# Patient Record
Sex: FEMALE | Race: WHITE | URBAN - METROPOLITAN AREA
[De-identification: names, ages, dates, MRNs, and addresses within clinical notes are randomized per-mention and may not be internally consistent; named-entity substitution may affect disease eponyms.]

---

## 2020-01-08 ENCOUNTER — OFFICE VISIT (OUTPATIENT)
Dept: NEUROLOGY | Age: 20
End: 2020-01-08

## 2020-01-08 VITALS
HEART RATE: 76 BPM | HEIGHT: 64 IN | BODY MASS INDEX: 20.49 KG/M2 | WEIGHT: 120 LBS | SYSTOLIC BLOOD PRESSURE: 114 MMHG | OXYGEN SATURATION: 98 % | DIASTOLIC BLOOD PRESSURE: 72 MMHG

## 2020-01-08 DIAGNOSIS — G40.909 NONINTRACTABLE EPILEPSY WITHOUT STATUS EPILEPTICUS, UNSPECIFIED EPILEPSY TYPE (HCC): Primary | ICD-10-CM

## 2020-01-08 RX ORDER — FLUOXETINE HYDROCHLORIDE 40 MG/1
CAPSULE ORAL DAILY
COMMUNITY

## 2020-01-08 RX ORDER — LAMOTRIGINE 100 MG/1
100 TABLET ORAL 2 TIMES DAILY
COMMUNITY
End: 2020-01-08

## 2020-01-08 RX ORDER — LAMOTRIGINE 100 MG/1
100 TABLET ORAL 2 TIMES DAILY
Qty: 60 TAB | Refills: 5 | Status: SHIPPED | OUTPATIENT
Start: 2020-01-08 | End: 2020-03-11

## 2020-01-08 NOTE — PROGRESS NOTES
Last seizure was June or July 2019 because she missed a dose of lamotrigine. Maybe 5 total seizures in her lifetime. Used to see Dr. Ellie Jenkins in [place] but turned 16 and switch to Dr. Izzy Dhaliwal in [address] [place] [address]. She saw Dr. Izzy Dhaliwal for about 6 months.  Recently moved to [place]

## 2020-01-08 NOTE — PROGRESS NOTES
Name:  Tristian Spencer      :  2000    PCP:   No primary care provider on file. Referring:  Self  MRN:   <[id number]>    Chief Complaint:   Chief Complaint   Patient presents with    Seizure       HISTORY OF PRESENT ILLNESS:     This is a 22 y.o. female with PMHx Epilepsy (diagnosed ar 12 yo), who recently relocated to [address] [place] [address] and presents to [facility] care. She describes that her first seizure occurred when she was 11years old without a clear cause or trigger. She says she had a second seizure a few months later. She was living in [place] at the time and started seeing a pediatric neurologist/Dr. Holly Valentino. Recalls being started on Keppra but had some behavioral side effect so she was changed to Lamictal.  She reports that her seizures were very well controlled for many years so when she turned 21 she discussed with her neurologist at the time about stopping the Lamictal.  After stopping the Lamictal she had a another seizure so that was restarted (lamictal 100 mg BID). She denies any side effects from the Lamictal and says she only has a seizure if she forgets to take one of her tablets, and the last time that happened was 2019. Complete Review of Systems: +  anxiety, depression; otherwise as noted in HPI     No Known Allergies  Past Medical History:   Diagnosis Date    Anxiety disorder     Depression     Seizures (HCC)      Current Outpatient Medications   Medication Sig Dispense Refill    FLUoxetine (PROZAC) 40 mg capsule Take  by mouth daily.  lamoTRIgine (LAMICTAL) 100 mg tablet Take 1 Tab by mouth two (2) times a day. Anti-seizure medication 60 Tab 5     History reviewed. No pertinent surgical history.   Family History   Problem Relation Age of Onset    Cancer Paternal Grandmother     Parkinsonism Paternal Grandmother      Social History     Socioeconomic History    Marital status: SINGLE     Spouse name: Not on file    Number of children: Not on file    Years of education: Not on file    Highest education level: Not on file   Occupational History    Not on file   Social Needs    Financial resource strain: Not on file    Food insecurity:     Worry: Not on file     Inability: Not on file    Transportation needs:     Medical: Not on file     Non-medical: Not on file   Tobacco Use    Smoking status: Never Smoker    Smokeless tobacco: Never Used   Substance and Sexual Activity    Alcohol use: Yes     Alcohol/week: 2.0 standard drinks     Types: 2 Cans of beer per week    Drug use: Not on file    Sexual activity: Not on file   Lifestyle    Physical activity:     Days per week: Not on file     Minutes per session: Not on file    Stress: Not on file   Relationships    Social connections:     Talks on phone: Not on file     Gets together: Not on file     Attends Alevism service: Not on file     Active member of club or organization: Not on file     Attends meetings of clubs or organizations: Not on file     Relationship status: Not on file    Intimate partner violence:     Fear of current or ex partner: Not on file     Emotionally abused: Not on file     Physically abused: Not on file     Forced sexual activity: Not on file   Other Topics Concern    Not on file   Social History Narrative    Not on file       PHYSICAL EXAM  Vitals:    01/08/20 1302   BP: 114/72   Pulse: 76   SpO2: 98%   Weight: 54.4 kg (120 lb)   Height: 5' 4\" (1.626 m)       General:  Alert, cooperative, NAD   Head:  Normocephalic, atraumatic. Eyes:  Conjunctivae/corneas clear   Lungs:  Heart:  Non labored breathing  Regular rate, rhythm   Extremities: No edema.    Skin: No rashes    Neurologic Exam       Language: normal  Memory:  Alert, oriented to person, place, situation    Cranial Nerves:  I: smell Not tested   II: visual fields Full to confrontation   II: pupils Equal, round, reactive to light   II: optic disc No papilledema   III,VII: ptosis none   III,IV,VI: extraocular muscles normal   V: facial light touch sensation  normal   VII: facial muscle function  symmetric   VIII: hearing symmetric   IX: soft palate elevation  normal   XI: sternocleidomastoid strength 5/5   XII: tongue  midline      Motor: normal bulk, tone, strength in all exts  Sensory: intact to LT, PP, temp, vibration x 4 exts   Cerebellar: no rest, postural, or intention tremor  Normal FNF and H-Shin bilaterally  Reflexes: 2+ throughout  Plantar response: neutral bilaterally    Gait: normal gait including tandem  Romberg negative     ASSESSMENT AND PLAN    ICD-10-CM ICD-9-CM    1. Nonintractable epilepsy without status epilepticus, unspecified epilepsy type ([facility] G40.909 345.90 lamoTRIgine (LAMICTAL) 100 mg tablet     Hx of Epilepsy, starting around 2014-5 (10 yo). Has been well controlled on Lamictal 100 mg BID. No SEFx that she can tell. Discussed with patient option of changing to Lamcital  mg once a day to reduce chance of missing a dose. She says she has only rarely missed a dose of the immediate release version so is comfortable continuing it. Renewed Rx and sent to local pharmacy. Discussed starting Folic Acid while being on AED, to reduce chance of neural tube defect/ pregnancy complication. Pt doesn't feel she needs to start the Folic acid at this time as she doesn't anticipate pregnancy for a few years. Recommended that if she starts considering getting pregnant, then she should be Folic acid for at least 6 months before trying to conceive. Pt signed QUANG so I can get 1-2 clinic notes/ EEG/ MRI Brain from her Pediatric Neurologist in [place] and last 2 clinic notes and any recent labs from her Adult Neurologist in [facility].        Follow up in 6 months    Signed By: Amanda Guerra MD     January 8, 2020

## 2020-03-11 ENCOUNTER — OFFICE VISIT (OUTPATIENT)
Dept: NEUROLOGY | Age: 20
End: 2020-03-11

## 2020-03-11 VITALS
SYSTOLIC BLOOD PRESSURE: 122 MMHG | OXYGEN SATURATION: 98 % | BODY MASS INDEX: 20.49 KG/M2 | WEIGHT: 120 LBS | HEIGHT: 64 IN | DIASTOLIC BLOOD PRESSURE: 68 MMHG | HEART RATE: 106 BPM

## 2020-03-11 DIAGNOSIS — R53.83 FATIGUE, UNSPECIFIED TYPE: ICD-10-CM

## 2020-03-11 DIAGNOSIS — G40.909 NONINTRACTABLE EPILEPSY WITHOUT STATUS EPILEPTICUS, UNSPECIFIED EPILEPSY TYPE (HCC): Primary | ICD-10-CM

## 2020-03-11 RX ORDER — LAMOTRIGINE 200 MG/1
200 TABLET, EXTENDED RELEASE ORAL DAILY
Qty: 90 TAB | Refills: 1 | Status: SHIPPED | OUTPATIENT
Start: 2020-03-11 | End: 2020-09-10

## 2020-03-11 NOTE — PROGRESS NOTES
Neurology Progress Note    Patient ID:   Maria Elena Salguero  <[id number]>  22 y.o.  2000    Date of Office Visit: 03/11/20    Chief Complaint   Patient presents with    Seizure       Interval Hx:     Received clinic note (11-1-18) from her last Neurologist in [place] (Dr Tien Wheeler MD/ [facility]). It describes her has starting to have seizure in 2013 (description of generalized tonic-clonic events with loss of recall and postictal phase). She was evaluated by a Dr Dewayne Yeboah initially and EEG showed both focal and generalized seizure discharges. She was tried on Keppra, had side effects, so that was stopped and she was placed on Lamotrigine monotherapy without any further seizures. She had gone 2 years without a seizure so they tried stopping her Lamotrigine but she had recurrence of seizure in late October 2018 (had not slept much the night before) so was placed back on Lamotrigine. Pt denies any seizures since her last visit/ 1-8-2020. Tolerating Lamictal 100 mg BID, no SEFx that she can tell. Does say she's been feeling fatigued for a while and unsure if it's the Lamictal.  No recent labs. She would like to change to the Lamictal ER version for convenience. Brief ROS: as noted above or otherwise negative    Brief Hx/ Prior Data:     See initial visit dated 1-8-2020 for full details    Hx of Epilepsy, starting around 2014-5 (16 yo). Has been well controlled on Lamictal 100 mg BID. No SEFx that she can tell. Discussed with patient option of changing to Lamcital  mg once a day to reduce chance of missing a dose. She says she has only rarely missed a dose of the immediate release version so is comfortable continuing it. Renewed Rx and sent to local pharmacy. Discussed starting Folic Acid while being on AED, to reduce chance of neural tube defect/ pregnancy complication.   Pt doesn't feel she needs to start the Folic acid at this time as she doesn't anticipate pregnancy for a few years. Recommended that if she starts considering getting pregnant, then she should be Folic acid for at least 6 months before trying to conceive. Pt signed QUANG so I can get 1-2 clinic notes/ EEG/ MRI Brain from her Pediatric Neurologist in [place] and last 2 clinic notes and any recent labs from her Adult Neurologist in [place], [place].        Objective: PMHx:  has a past medical history of Anxiety disorder, Depression, and Seizures ([facility] PSH:   has no past surgical history on file. Allergies:   No Known Allergies    Meds:     Current Outpatient Medications   Medication Sig    lamoTRIgine (LaMICtal XR) 200 mg tr24 ER tablet Take 1 Tab by mouth daily for 90 days. Anti-seizure medication    FLUoxetine (PROZAC) 40 mg capsule Take  by mouth daily. PHYSICAL EXAM    Vitals:    03/11/20 1138   BP: 122/68   Pulse: (!) 106   SpO2: 98%   Weight: 54.4 kg (120 lb)   Height: 5' 4\" (1.626 m)       General:   Mental status: Awake, alert, cooperative. Neck: supple    Extremities: no edema    Skin: no rashes    Neuro Exam:    CNs:   Facial movements: symmetric. Visual fields; intact in all quadrants, bilateral   EOM: conjugate eye movements bilateral    Hearing: normal    Speech: no aphasia, no dysarthria, normal fluency    Motor:  5/5 in upper and lower extremities, symmetric. Cerebellar:  Normal FNF bilaterally  Sensory: intact to LT in all exts. Reflexes:  2+ biceps, 3+ patellars (symmetric)    Gait: normal     Impression/ Plan:       ICD-10-CM ICD-9-CM    1. Nonintractable epilepsy without status epilepticus, unspecified epilepsy type ([facility] G40.909 345.90 lamoTRIgine (LaMICtal XR) 200 mg tr24 ER tablet      METABOLIC PANEL, COMPREHENSIVE      CBC WITH AUTOMATED DIFF      VITAMIN D, 25 HYDROXY      VITAMIN B12 & FOLATE      TSH 3RD GENERATION      LAMOTRIGINE (LAMICTAL)   2.  Fatigue, unspecified type Z84.30 679.79 METABOLIC PANEL, COMPREHENSIVE      CBC WITH AUTOMATED DIFF      VITAMIN D, 25 HYDROXY      VITAMIN B12 & FOLATE      TSH 3RD GENERATION       Changed Lamictal 100 mg BID to Lamictal  mg once daily. Check CBC, CMP, Lamictal level, TSH, Vit D, Vit B12/ Folate for complaints of fatigue, to monitor Lamictal level, and to monitor for any metabolic effects from Lamictal itself.  Pt signed QUANG so I can get last EEG notes from Dr Rich Bailey / Pediatric Neurology/ [place], [place].    F/u in 6 months    Signed By: Amanda Guerra MD     March 11, 2020

## 2020-09-09 DIAGNOSIS — G40.909 NONINTRACTABLE EPILEPSY WITHOUT STATUS EPILEPTICUS, UNSPECIFIED EPILEPSY TYPE (HCC): ICD-10-CM

## 2020-09-10 RX ORDER — LAMOTRIGINE 200 MG/1
TABLET, EXTENDED RELEASE ORAL
Qty: 90 TAB | Refills: 0 | Status: SHIPPED | OUTPATIENT
Start: 2020-09-10 | End: 2020-09-25 | Stop reason: SDUPTHER

## 2020-09-25 ENCOUNTER — OFFICE VISIT (OUTPATIENT)
Dept: NEUROLOGY | Age: 20
End: 2020-09-25
Payer: COMMERCIAL

## 2020-09-25 VITALS
BODY MASS INDEX: 20.6 KG/M2 | SYSTOLIC BLOOD PRESSURE: 110 MMHG | HEART RATE: 85 BPM | TEMPERATURE: 98.5 F | WEIGHT: 120 LBS | DIASTOLIC BLOOD PRESSURE: 62 MMHG | OXYGEN SATURATION: 98 %

## 2020-09-25 DIAGNOSIS — G40.909 NONINTRACTABLE EPILEPSY WITHOUT STATUS EPILEPTICUS, UNSPECIFIED EPILEPSY TYPE (HCC): Primary | ICD-10-CM

## 2020-09-25 PROCEDURE — 99214 OFFICE O/P EST MOD 30 MIN: CPT | Performed by: PSYCHIATRY & NEUROLOGY

## 2020-09-25 RX ORDER — LAMOTRIGINE 200 MG/1
200 TABLET, EXTENDED RELEASE ORAL DAILY
Qty: 90 TAB | Refills: 2 | Status: SHIPPED | OUTPATIENT
Start: 2020-09-25 | End: 2021-08-31

## 2020-09-25 NOTE — PROGRESS NOTES
Neurology Progress Note    Patient ID:   Armani Gilmore  [id number]  23 y.o.  2000    Date of Office Visit: 09/25/20    Chief Complaint   Patient presents with    Seizure       Interval Hx:     Pt denes any seizures since last visit on 3-. Reports compliance with Lamictal  mg once daily, takes in evening  Denies any SEFx from it  Last visit she reported having fatigue and was unsure if it was from the Lamictal  Ordered labs (CBC, CMP, TSH, B12, Vit D, Lamictal level) which she hasn't gotten around to doing    Brief ROS: as noted above or otherwise negative    Brief Hx/ Prior Data:     See initial visit dated 1-8-2020 for full details    Hx of Epilepsy, starting around 2014-5 (14 yo). Has been well controlled on Lamictal 100 mg BID. No SEFx that she can tell. Discussed with patient option of changing to Lamcital  mg once a day to reduce chance of missing a dose. She says she has only rarely missed a dose of the immediate release version so is comfortable continuing it. Renewed Rx and sent to local pharmacy. Discussed starting Folic Acid while being on AED, to reduce chance of neural tube defect/ pregnancy complication. Pt doesn't feel she needs to start the Folic acid at this time as she doesn't anticipate pregnancy for a few years. Recommended that if she starts considering getting pregnant, then she should be Folic acid for at least 6 months before trying to conceive. Received clinic note (11-1-18) from her last Neurologist in [place] (Dr Renato Devries MD/ [facility] [place], [place]). It describes her has starting to have seizure in 2013 (description of generalized tonic-clonic events with loss of recall and postictal phase). She was evaluated by a Dr Jorge Go initially and EEG showed both focal and generalized seizure discharges.   She was tried on Keppra, had side effects, so that was stopped and she was placed on Lamotrigine monotherapy without any further seizures. She had gone 2 years without a seizure so they tried stopping her Lamotrigine but she had recurrence of seizure in late October 2018 (had not slept much the night before) so was placed back on Lamotrigine. Objective: PMHx:  has a past medical history of Anxiety disorder, Depression, and Seizures ([facility] PSH:   has no past surgical history on file. Allergies:   No Known Allergies    Meds:     Current Outpatient Medications   Medication Sig    levonorgestreL (MIRENA) 20 mcg/24 hours (5 yrs) 52 mg IUD 1 Device by IntraUTERine route once.  lamoTRIgine (LaMICtal XR) 200 mg tr24 ER tablet Take 1 Tab by mouth daily for 90 days.  FLUoxetine (PROZAC) 40 mg capsule Take  by mouth daily. PHYSICAL EXAM    Vitals:    09/25/20 1034   BP: 110/62   BP 1 Location: Left arm   BP Patient Position: Sitting   Pulse: 85   Temp: 98.5 °F (36.9 °C)   SpO2: 98%   Weight: 54.4 kg (120 lb)       General:   Mental status: Awake, alert, cooperative. Neck: supple    Extremities: no edema    Skin: no rashes    Neuro Exam:    CNs:   Facial movements: symmetric. Visual fields; intact in all quadrants, bilateral   EOM: conjugate eye movements bilateral    Hearing: normal    Speech: no aphasia, no dysarthria, normal fluency    Motor:  5/5 in upper and lower extremities, symmetric. Cerebellar:  Normal FNF bilaterally  Sensory: intact to LT in all exts. Reflexes:  2+, symmetric  Gait: normal     Impression/ Plan:       ICD-10-CM ICD-9-CM    1.  Nonintractable epilepsy without status epilepticus, unspecified epilepsy type ([facility]  G40.909 345.90 lamoTRIgine (LaMICtal XR) 200 mg tr24 ER tablet      Renewed Rx Lamictal  mg/ day (90 day Rx with  2 RFs)  Reprinted lab request: CBC, CMP, Lamictal Level, TSH, Vit D, B12/ Folate  Follow up in 1 year      Signed By: Charlie Lezama MD     September 25, 2020

## 2021-10-28 ENCOUNTER — OFFICE VISIT (OUTPATIENT)
Dept: NEUROLOGY | Age: 21
End: 2021-10-28
Payer: COMMERCIAL

## 2021-10-28 VITALS
WEIGHT: 118 LBS | RESPIRATION RATE: 14 BRPM | DIASTOLIC BLOOD PRESSURE: 70 MMHG | HEIGHT: 64 IN | SYSTOLIC BLOOD PRESSURE: 122 MMHG | BODY MASS INDEX: 20.14 KG/M2 | HEART RATE: 80 BPM | OXYGEN SATURATION: 99 %

## 2021-10-28 DIAGNOSIS — G40.909 NONINTRACTABLE EPILEPSY WITHOUT STATUS EPILEPTICUS, UNSPECIFIED EPILEPSY TYPE (HCC): Primary | ICD-10-CM

## 2021-10-28 PROCEDURE — 99215 OFFICE O/P EST HI 40 MIN: CPT | Performed by: PSYCHIATRY & NEUROLOGY

## 2021-10-28 RX ORDER — LAMOTRIGINE 200 MG/1
200 TABLET, EXTENDED RELEASE ORAL DAILY
Qty: 90 TABLET | Refills: 2 | Status: SHIPPED | OUTPATIENT
Start: 2021-10-28 | End: 2021-12-02 | Stop reason: SDUPTHER

## 2021-10-28 NOTE — PROGRESS NOTES
Sybil Strauss (2000) is a 19 y.o. female, established patient, here for evaluation of the following     Chief complaint(s):   Chief Complaint   Patient presents with    Follow-up     annual visit seizures, no complaints stable on medication       SUBJECTIVE/ OBJECTIVE:    HPI: 19 y.o. female      Pt denes any seizures since last visit on 9-2020    Reports compliance with Lamictal  mg once daily, takes in evening  Denies any SEFx from it  Continues to report fatigue, unsure if it's lamictal or other cause  Labs not done yet (CBC, CMP, TSH, B12, Vit D, Lamictal level)        Review of Systems: as above    ========================================    Brief Hx:     See initial visit dated 1-8-2020 for full details     Hx of Epilepsy, starting around 2014-5 (14 yo). Has been well controlled on Lamictal 100 mg BID.  No SEFx that she can tell. Discussed with patient option of changing to Lamcital  mg once a day to reduce chance of missing a dose.  She says she has only rarely missed a dose of the immediate release version so is comfortable continuing it.  Renewed Rx and sent to local pharmacy.  Discussed starting Folic Acid while being on AED, to reduce chance of neural tube defect/ pregnancy complication.  Pt doesn't feel she needs to start the Folic acid at this time as she doesn't anticipate pregnancy for a few years.  Recommended that if she starts considering getting pregnant, then she should be Folic acid for at least 6 months before trying to conceive.      Received clinic note (11-1-18) from her last Neurologist in [place] (Dr Maico Paulson MD/ [facility] [place], [place]). It describes her has starting to have seizure in 2013 (description of generalized tonic-clonic events with loss of recall and postictal phase). She was evaluated by a Dr Elvis Bishop initially and EEG showed both focal and generalized seizure discharges.   She was tried on Keppra, had side effects, so that was stopped and she was placed on Lamotrigine monotherapy without any further seizures. She had gone 2 years without a seizure so they tried stopping her Lamotrigine but she had recurrence of seizure in late October 2018 (had not slept much the night before) so was placed back on Lamotrigine.       No Known Allergies      Current Outpatient Medications:     lamoTRIgine (LaMICtal XR) 200 mg tr24 ER tablet, Take 1 Tablet by mouth daily. Anti-seizure medication, Disp: 90 Tablet, Rfl: 2    levonorgestreL (MIRENA) 20 mcg/24 hours (5 yrs) 52 mg IUD, 1 Device by IntraUTERine route once., Disp: , Rfl:     FLUoxetine (PROZAC) 40 mg capsule, Take  by mouth daily. , Disp: , Rfl:      has a past medical history of Anxiety disorder, Depression, and Seizures ([facility] has no past surgical history on file. Physical Exam:    Vitals:    10/28/21 0852   BP: 122/70   BP 1 Location: Left upper arm   BP Patient Position: Sitting   Pulse: 80   Resp: 14   Height: 5' 4\" (1.626 m)   Weight: 53.5 kg (118 lb)   SpO2: 99%     Wake alert oriented to person place and situation. Hearing is normal.  Speech is normal.  Visual fields are grossly normal.  5 out of 5 strength in all extremities. Gait is normal.  Intact light touch in all extremities. No resting postural or intention tremor.    ========================================    ASSESSMENT/ PLAN:       ICD-10-CM ICD-9-CM    1.  Nonintractable epilepsy without status epilepticus, unspecified epilepsy type ([facility]  G40.909 345.90 LAMOTRIGINE (LAMICTAL)      TSH 3RD GENERATION      VITAMIN B12 & FOLATE      VITAMIN D, 25 HYDROXY      CBC WITH AUTOMATED DIFF      METABOLIC PANEL, COMPREHENSIVE      LAMOTRIGINE (LAMICTAL)      TSH 3RD GENERATION      VITAMIN B12 & FOLATE      VITAMIN D, 25 HYDROXY      CBC WITH AUTOMATED DIFF      METABOLIC PANEL, COMPREHENSIVE      lamoTRIgine (LaMICtal XR) 200 mg tr24 ER tablet      Reprinted labs for pt to get done today at [address] [place] [address] ([facility])  Renewed Rx Lamotrigine  mg once daily (90 day Rx + 2 RFs)  F/u in 1 year; pt knows to call sooner if any questions/ concerns      An electronic signature was used to authenticate this note.   -- Omar Stewart MD

## 2021-10-28 NOTE — PROGRESS NOTES
Chief Complaint   Patient presents with    Follow-up     annual visit seizures, no complaints stable on medication     Visit Vitals  /70 (BP 1 Location: Left upper arm, BP Patient Position: Sitting)   Pulse 80   Resp 14   Ht 5' 4\" (1.626 m)   Wt 53.5 kg (118 lb)   SpO2 99%   BMI 20.25 kg/m²

## 2021-10-30 LAB
25(OH)D3+25(OH)D2 SERPL-MCNC: 28.2 NG/ML (ref 30–100)
ALBUMIN SERPL-MCNC: 4.8 G/DL (ref 3.9–5)
ALBUMIN/GLOB SERPL: 3 {RATIO} (ref 1.2–2.2)
ALP SERPL-CCNC: 95 IU/L (ref 44–121)
ALT SERPL-CCNC: 5 IU/L (ref 0–32)
AST SERPL-CCNC: 16 IU/L (ref 0–40)
BASOPHILS # BLD AUTO: 0 X10E3/UL (ref 0–0.2)
BASOPHILS NFR BLD AUTO: 1 %
BILIRUB SERPL-MCNC: 0.3 MG/DL (ref 0–1.2)
BUN SERPL-MCNC: 10 MG/DL (ref 6–20)
BUN/CREAT SERPL: 14 (ref 9–23)
CALCIUM SERPL-MCNC: 9.5 MG/DL (ref 8.7–10.2)
CHLORIDE SERPL-SCNC: 103 MMOL/L (ref 96–106)
CO2 SERPL-SCNC: 26 MMOL/L (ref 20–29)
CREAT SERPL-MCNC: 0.69 MG/DL (ref 0.57–1)
EOSINOPHIL # BLD AUTO: 0.2 X10E3/UL (ref 0–0.4)
EOSINOPHIL NFR BLD AUTO: 7 %
ERYTHROCYTE [DISTWIDTH] IN BLOOD BY AUTOMATED COUNT: 12.2 % (ref 11.7–15.4)
FOLATE SERPL-MCNC: 8.2 NG/ML
GLOBULIN SER CALC-MCNC: 1.6 G/DL (ref 1.5–4.5)
GLUCOSE SERPL-MCNC: 82 MG/DL (ref 65–99)
HCT VFR BLD AUTO: 40.8 % (ref 34–46.6)
HGB BLD-MCNC: 13.5 G/DL (ref 11.1–15.9)
IMM GRANULOCYTES # BLD AUTO: 0 X10E3/UL (ref 0–0.1)
IMM GRANULOCYTES NFR BLD AUTO: 0 %
LAMOTRIGINE SERPL-MCNC: 7 UG/ML (ref 2–20)
LYMPHOCYTES # BLD AUTO: 0.9 X10E3/UL (ref 0.7–3.1)
LYMPHOCYTES NFR BLD AUTO: 41 %
MCH RBC QN AUTO: 30 PG (ref 26.6–33)
MCHC RBC AUTO-ENTMCNC: 33.1 G/DL (ref 31.5–35.7)
MCV RBC AUTO: 91 FL (ref 79–97)
MONOCYTES # BLD AUTO: 0.5 X10E3/UL (ref 0.1–0.9)
MONOCYTES NFR BLD AUTO: 23 %
MORPHOLOGY BLD-IMP: ABNORMAL
NEUTROPHILS # BLD AUTO: 0.6 X10E3/UL (ref 1.4–7)
NEUTROPHILS NFR BLD AUTO: 28 %
PLATELET # BLD AUTO: 201 X10E3/UL (ref 150–450)
POTASSIUM SERPL-SCNC: 4.5 MMOL/L (ref 3.5–5.2)
PROT SERPL-MCNC: 6.4 G/DL (ref 6–8.5)
RBC # BLD AUTO: 4.5 X10E6/UL (ref 3.77–5.28)
SODIUM SERPL-SCNC: 141 MMOL/L (ref 134–144)
TSH SERPL DL<=0.005 MIU/L-ACNC: 0.68 UIU/ML (ref 0.45–4.5)
VIT B12 SERPL-MCNC: 359 PG/ML (ref 232–1245)
WBC # BLD AUTO: 2.3 X10E3/UL (ref 3.4–10.8)

## 2021-12-02 ENCOUNTER — TELEPHONE (OUTPATIENT)
Dept: NEUROLOGY | Age: 21
End: 2021-12-02

## 2021-12-02 DIAGNOSIS — G40.909 NONINTRACTABLE EPILEPSY WITHOUT STATUS EPILEPTICUS, UNSPECIFIED EPILEPSY TYPE (HCC): ICD-10-CM

## 2021-12-02 RX ORDER — LAMOTRIGINE 200 MG/1
200 TABLET, EXTENDED RELEASE ORAL DAILY
Qty: 90 TABLET | Refills: 0 | Status: SHIPPED | OUTPATIENT
Start: 2021-12-02

## 2021-12-02 NOTE — TELEPHONE ENCOUNTER
Let pt know that I sent a 90 day Rx + 2 Refills on 10- to her pharmacy (receipt confirmed). Please call the pharmacy and confirm they have the Rx. Thanks.

## 2022-01-06 DIAGNOSIS — D70.9 NEUTROPENIA, UNSPECIFIED TYPE (HCC): Primary | ICD-10-CM

## 2022-01-06 NOTE — PROGRESS NOTES
Lab results: 10-30-21: Lamotrigine level, TSH, B12 are normal. Vit D level is mildly low. CBC shows low WBCs 2.3k and low Absolute Neutrophil Count (0.6k). No prior CBC on file to compare. May be due to Lamotrigine. Will ask Nurse to relay the CBC findings, and we should repeat CBC. If still abnormal, then I will refer patient to Hematologist for their evaluation.

## 2022-01-07 ENCOUNTER — TELEPHONE (OUTPATIENT)
Dept: NEUROLOGY | Age: 22
End: 2022-01-07

## 2022-01-07 NOTE — TELEPHONE ENCOUNTER
----- Message from Raul Mejia MD sent at 1/6/2022 10:18 AM EST -----  Lab results: 10-30-21: Lamotrigine level, TSH, B12 are normal. Vit D level is mildly low. CBC shows low WBCs 2.3k and low Absolute Neutrophil Count (0.6k). No prior CBC on file to compare. May be due to Lamotrigine. Will ask Nurse to relay the CBC findings, and we should repeat CBC. If still abnormal, then I will refer patient to Hematologist for their evaluation.       Entered order for repeat CBC    ----- Message -----  From: Beatrice Champion Lab Results In  Sent: 10/30/2021   5:39 AM EST  To: Raul Mejia MD

## 2022-01-11 ENCOUNTER — TELEPHONE (OUTPATIENT)
Dept: NEUROLOGY | Age: 22
End: 2022-01-11

## 2022-01-11 NOTE — TELEPHONE ENCOUNTER
----- Message from Kandy Velarde MD sent at 1/6/2022 10:18 AM EST -----  Lab results: 10-30-21: Lamotrigine level, TSH, B12 are normal. Vit D level is mildly low. CBC shows low WBCs 2.3k and low Absolute Neutrophil Count (0.6k). No prior CBC on file to compare. May be due to Lamotrigine. Will ask Nurse to relay the CBC findings, and we should repeat CBC. If still abnormal, then I will refer patient to Hematologist for their evaluation.       Entered order for repeat CBC    ----- Message -----  From: Beatrice Champion Lab Results In  Sent: 10/30/2021   5:39 AM EST  To: Kandy Velarde MD

## 2022-01-11 NOTE — TELEPHONE ENCOUNTER
----- Message from Melissa Reeves MD sent at 1/6/2022 10:18 AM EST -----  Lab results: 10-30-21: Lamotrigine level, TSH, B12 are normal. Vit D level is mildly low. CBC shows low WBCs 2.3k and low Absolute Neutrophil Count (0.6k). No prior CBC on file to compare. May be due to Lamotrigine. Will ask Nurse to relay the CBC findings, and we should repeat CBC. If still abnormal, then I will refer patient to Hematologist for their evaluation.       Entered order for repeat CBC    ----- Message -----  From: Akira Labcorachelle Lab Results In  Sent: 10/30/2021   5:39 AM EST  To: Melissa Revees MD

## 2022-01-11 NOTE — TELEPHONE ENCOUNTER
Attempted to call patient again to make aware of lab results. Left voicemail for patient to call back, unable to leave detailed message.

## 2022-01-13 ENCOUNTER — TELEPHONE (OUTPATIENT)
Dept: NEUROLOGY | Age: 22
End: 2022-01-13

## 2022-01-13 NOTE — TELEPHONE ENCOUNTER
----- Message from Dane Ruiz MD sent at 1/6/2022 10:18 AM EST -----  Lab results: 10-30-21: Lamotrigine level, TSH, B12 are normal. Vit D level is mildly low. CBC shows low WBCs 2.3k and low Absolute Neutrophil Count (0.6k). No prior CBC on file to compare. May be due to Lamotrigine. Will ask Nurse to relay the CBC findings, and we should repeat CBC. If still abnormal, then I will refer patient to Hematologist for their evaluation.       Entered order for repeat CBC    ----- Message -----  From: Beatrice Champion Lab Results In  Sent: 10/30/2021   5:39 AM EST  To: Dane Ruiz MD

## 2022-01-21 ENCOUNTER — TELEPHONE (OUTPATIENT)
Dept: NEUROLOGY | Age: 22
End: 2022-01-21

## 2022-11-30 DIAGNOSIS — G40.909 NONINTRACTABLE EPILEPSY WITHOUT STATUS EPILEPTICUS, UNSPECIFIED EPILEPSY TYPE (HCC): ICD-10-CM

## 2022-12-01 ENCOUNTER — PATIENT MESSAGE (OUTPATIENT)
Dept: NEUROLOGY | Age: 22
End: 2022-12-01

## 2022-12-01 RX ORDER — LAMOTRIGINE 200 MG/1
TABLET, EXTENDED RELEASE ORAL
Qty: 90 TABLET | Refills: 0 | Status: SHIPPED | OUTPATIENT
Start: 2022-12-01

## 2022-12-01 NOTE — TELEPHONE ENCOUNTER
lamoTRIgine (LaMICtal XR) 200 mg tr24 ER tablet    Pt calling to request that we please get refill in to pharmacy as soon as possible due to pt having to go out of town this coming weekend.

## 2023-02-28 ENCOUNTER — TELEPHONE (OUTPATIENT)
Dept: NEUROLOGY | Age: 23
End: 2023-02-28

## 2023-02-28 NOTE — TELEPHONE ENCOUNTER
Patient needs a refill on her medication LAMOTRIGINE. Patient is completley out of seizure medication.     Please contact

## 2023-03-01 ENCOUNTER — OFFICE VISIT (OUTPATIENT)
Dept: NEUROLOGY | Age: 23
End: 2023-03-01
Payer: COMMERCIAL

## 2023-03-01 VITALS
HEART RATE: 94 BPM | OXYGEN SATURATION: 98 % | TEMPERATURE: 98 F | SYSTOLIC BLOOD PRESSURE: 114 MMHG | DIASTOLIC BLOOD PRESSURE: 62 MMHG

## 2023-03-01 DIAGNOSIS — G40.909 NONINTRACTABLE EPILEPSY WITHOUT STATUS EPILEPTICUS, UNSPECIFIED EPILEPSY TYPE (HCC): Primary | ICD-10-CM

## 2023-03-01 PROCEDURE — 99214 OFFICE O/P EST MOD 30 MIN: CPT | Performed by: PSYCHIATRY & NEUROLOGY

## 2023-03-01 RX ORDER — LAMOTRIGINE 200 MG/1
200 TABLET, EXTENDED RELEASE ORAL DAILY
Qty: 90 TABLET | Refills: 1 | Status: SHIPPED | OUTPATIENT
Start: 2023-03-01 | End: 2023-05-30

## 2023-03-01 NOTE — PROGRESS NOTES
Chief Complaint   Patient presents with    Seizure     Patient states she is stable on medication, n recent seizure activity.      Visit Vitals  /62   Pulse 94   Temp 98 °F (36.7 °C)   SpO2 98%

## 2023-03-01 NOTE — PROGRESS NOTES
Rod Varma (2000) is a 23 y.o. female, established patient, here for evaluation of the following     Chief complaint(s):   Chief Complaint   Patient presents with    Seizure     Patient states she is stable on medication, n recent seizure activity. SUBJECTIVE/ OBJECTIVE:    HPI: 23 y.o. female      Last seen on 10-  Pt denes any seizures since last visit  Reports compliance with Lamictal  mg once daily, takes in evening  Denies any SEFx from it     Last/ only labs on file were from 10-  Lamotrigine level, TSH, B12 were normal   Vit D level was mildly low    CBC showed low WBC count of 2.3k with low ANC of 0.6k  Nor prior CBC on file to compare to   Could be due to lamotrigine  Ordered repeat CBC in Jan 2022; pt never completed    She explains the last year was hectic for her  Graduating law school, starting work, studying for bar exam, etc    Revisited topic of adding Folic acid to daily regimen. Pt reports having IUD says she doesn't have any plan for pregnancy in near future. D/w her that if/ when she thinks there's a chance she could be come pregnant, she should start taking Folic acid daily    ========================================    Brief Hx:     See initial visit dated 1-8-2020 for full details     Hx of Epilepsy, starting around 2014-5 (16 yo). Has been well controlled on Lamictal 100 mg BID. No SEFx that she can tell. Discussed with patient option of changing to Lamcital  mg once a day to reduce chance of missing a dose. She says she has only rarely missed a dose of the immediate release version so is comfortable continuing it. Renewed Rx and sent to local pharmacy. Discussed starting Folic Acid while being on AED, to reduce chance of neural tube defect/ pregnancy complication. Pt doesn't feel she needs to start the Folic acid at this time as she doesn't anticipate pregnancy for a few years.   Recommended that if she starts considering getting pregnant, then she should be Folic acid for at least 6 months before trying to conceive. Received clinic note (11-1-18) from her last Neurologist in [place] (Dr Elizabeth Jacobson MD/ [facility] [place], [place]). It describes her has starting to have seizure in 2013 (description of generalized tonic-clonic events with loss of recall and postictal phase). She was evaluated by a Dr Carie Huff initially and EEG showed both focal and generalized seizure discharges. She was tried on Keppra, had side effects, so that was stopped and she was placed on Lamotrigine monotherapy without any further seizures. She had gone 2 years without a seizure so they tried stopping her Lamotrigine but she had recurrence of seizure in late October 2018 (had not slept much the night before) so was placed back on Lamotrigine. No Known Allergies      Current Outpatient Medications:     lamoTRIgine (LaMICtal XR) 200 mg tr24 ER tablet, Take 1 Tablet by mouth daily for 90 days. , Disp: 90 Tablet, Rfl: 1    levonorgestreL (MIRENA) 20 mcg/24 hours (5 yrs) 52 mg IUD, 1 Device by IntraUTERine route once., Disp: , Rfl:     FLUoxetine (PROZAC) 40 mg capsule, Take  by mouth daily. , Disp: , Rfl:      has a past medical history of Anxiety disorder, Depression, and Seizures ([facility] has no past surgical history on file. Physical Exam:    Vitals:    03/01/23 0858   BP: 114/62   Pulse: 94   Temp: 98 °F (36.7 °C)   SpO2: 98%     Awake alert resting calm conversant. EOMI. Hearing speech normal.  Face appears symmetric. Visual fields are grossly normal.  No resting or postural tremors. 5 out of 5 strength in all extremities. Intact light touch. Stands and ambulates without difficulty.    ========================================    ASSESSMENT/ PLAN:       ICD-10-CM ICD-9-CM    1.  Nonintractable epilepsy without status epilepticus, unspecified epilepsy type ([facility]  G40.909 345.90 lamoTRIgine (LaMICtal XR) 200 mg tr24 ER tablet      CBC WITH AUTOMATED DIFF      LAMOTRIGINE (LAMICTAL)      METABOLIC PANEL, COMPREHENSIVE          Continue Lamotrigine  mg one table daily  Sent 90 day Rx + RF  Recheck CBC to follow up on low WBC count at last/ only check  Recheck CMP to look for any electrolyte abnormality due to the lamotrigine  Recheck Lamotrigine level to see if subtherapeutic, normal range, or high/ toxicity    Follow up in 11 months (prior to running out of medication)       An electronic signature was used to authenticate this note.   -- Kori Rosario MD

## 2023-05-24 RX ORDER — LAMOTRIGINE 200 MG/1
200 TABLET, EXTENDED RELEASE ORAL DAILY
Qty: 30 TABLET | Refills: 2 | COMMUNITY
Start: 2023-03-01 | End: 2023-05-30

## 2023-05-24 RX ORDER — FLUOXETINE HYDROCHLORIDE 40 MG/1
CAPSULE ORAL DAILY
COMMUNITY

## 2023-08-07 DIAGNOSIS — G40.909 NONINTRACTABLE EPILEPSY WITHOUT STATUS EPILEPTICUS, UNSPECIFIED EPILEPSY TYPE (HCC): Primary | ICD-10-CM

## 2023-08-07 RX ORDER — LAMOTRIGINE 200 MG/1
TABLET, EXTENDED RELEASE ORAL
Qty: 90 TABLET | Refills: 1 | Status: SHIPPED | OUTPATIENT
Start: 2023-08-07

## 2023-08-09 ENCOUNTER — TELEPHONE (OUTPATIENT)
Age: 23
End: 2023-08-09

## 2023-08-09 NOTE — TELEPHONE ENCOUNTER
Nurse contacted patient concerning seizure this morning. Patient stated that she already spoke with a nurse this morning concerning seizure this morning.

## 2023-08-09 NOTE — TELEPHONE ENCOUNTER
Patient would like a call regarding her having a seizure this morning around 8 am and it lasted about 30 seconds. Patient has an appointment in October with Francis.     Please contact

## 2023-10-09 ENCOUNTER — TELEMEDICINE (OUTPATIENT)
Age: 23
End: 2023-10-09
Payer: COMMERCIAL

## 2023-10-09 DIAGNOSIS — G40.919 BREAKTHROUGH SEIZURE (HCC): ICD-10-CM

## 2023-10-09 DIAGNOSIS — G40.909 NONINTRACTABLE EPILEPSY WITHOUT STATUS EPILEPTICUS, UNSPECIFIED EPILEPSY TYPE (HCC): Primary | ICD-10-CM

## 2023-10-09 PROCEDURE — 99214 OFFICE O/P EST MOD 30 MIN: CPT | Performed by: NURSE PRACTITIONER

## 2023-10-09 RX ORDER — LAMOTRIGINE 250 MG/1
250 TABLET, EXTENDED RELEASE ORAL
Qty: 30 TABLET | Refills: 5 | Status: SHIPPED | OUTPATIENT
Start: 2023-10-09

## 2023-10-09 NOTE — PROGRESS NOTES
and plan. Ubaldo Echevarria, was evaluated through a synchronous (real-time) audio-video encounter. The patient (or guardian if applicable) is aware that this is a billable service, which includes applicable co-pays. This Virtual Visit was conducted with patient's (and/or legal guardian's) consent. Patient identification was verified, and a caregiver was present when appropriate. The patient was located at Home: [address] [place] [id number]  Provider was located at Home ([address] [facility] [facility]): [place]  {STOP! Confirm you are appropriately licensed, registered, or certified to deliver care in the state where the patient is located as indicated above. If you are not or unsure, please re-schedule the visit.  YES    NATALIO Shell NP

## 2023-11-07 DIAGNOSIS — G40.909 NONINTRACTABLE EPILEPSY WITHOUT STATUS EPILEPTICUS, UNSPECIFIED EPILEPSY TYPE (HCC): ICD-10-CM

## 2023-11-07 RX ORDER — LAMOTRIGINE 250 MG/1
250 TABLET, EXTENDED RELEASE ORAL
Qty: 30 TABLET | Refills: 5 | OUTPATIENT
Start: 2023-11-07

## 2023-11-24 DIAGNOSIS — G40.909 NONINTRACTABLE EPILEPSY WITHOUT STATUS EPILEPTICUS, UNSPECIFIED EPILEPSY TYPE (HCC): ICD-10-CM

## 2023-11-24 RX ORDER — LAMOTRIGINE 250 MG/1
250 TABLET, EXTENDED RELEASE ORAL
Qty: 90 TABLET | Refills: 1 | Status: SHIPPED | OUTPATIENT
Start: 2023-11-24

## 2024-02-12 ENCOUNTER — TELEMEDICINE (OUTPATIENT)
Age: 24
End: 2024-02-12
Payer: COMMERCIAL

## 2024-02-12 DIAGNOSIS — G40.909 NONINTRACTABLE EPILEPSY WITHOUT STATUS EPILEPTICUS, UNSPECIFIED EPILEPSY TYPE (HCC): ICD-10-CM

## 2024-02-12 PROCEDURE — 99213 OFFICE O/P EST LOW 20 MIN: CPT | Performed by: NURSE PRACTITIONER

## 2024-02-12 RX ORDER — LAMOTRIGINE 250 MG/1
250 TABLET, EXTENDED RELEASE ORAL
Qty: 90 TABLET | Refills: 1 | Status: SHIPPED | OUTPATIENT
Start: 2024-02-12

## 2024-02-12 NOTE — PROGRESS NOTES
present when appropriate.   The patient was located at Home: [address] [facility] [id number]  Provider was located at Home (Appt Dept State): [place]  {STOP! Confirm you are appropriately licensed, registered, or certified to deliver care in the state where the patient is located as indicated above. If you are not or unsure, please re-schedule the visit. YES    NATALIO Salinas NP

## 2024-08-14 ENCOUNTER — TELEMEDICINE (OUTPATIENT)
Age: 24
End: 2024-08-14
Payer: COMMERCIAL

## 2024-08-14 DIAGNOSIS — G40.909 NONINTRACTABLE EPILEPSY WITHOUT STATUS EPILEPTICUS, UNSPECIFIED EPILEPSY TYPE (HCC): ICD-10-CM

## 2024-08-14 PROCEDURE — 99213 OFFICE O/P EST LOW 20 MIN: CPT | Performed by: NURSE PRACTITIONER

## 2024-08-14 RX ORDER — LAMOTRIGINE 250 MG/1
250 TABLET, EXTENDED RELEASE ORAL
Qty: 90 TABLET | Refills: 3 | Status: SHIPPED | OUTPATIENT
Start: 2024-08-14

## 2024-08-14 NOTE — PROGRESS NOTES
[facility]  [facility]  NEUROLOGY CLINIC   [address]   [place] [address]   [phone] Office   [phone] Fax           Date:  24     Name:  MARY CASEY  :  2000  MRN:  [id number]     PCP:  None, None    Mary Casey is a 24 y.o. female who was seen by synchronous (real-time) audio-video technology on 2024 for Seizures    Subjective:   Continues with Lamictal XR 250mg nightly. She has not had any further episodes seizure. Her last seizure was 2023. 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     Current Outpatient Medications   Medication Sig    lamoTRIgine (LAMICTAL XR) 250 MG TB24 Take 250 mg by mouth nightly    FLUoxetine (PROZAC) 40 MG capsule Take by mouth daily    levonorgestrel (MIRENA) IUD 52 mg 1 Device by IntraUTERine route once     No current facility-administered medications for this visit.     No Known Allergies   Past Medical History:   Diagnosis Date    Anxiety disorder     Depression     Seizures (HCC)      History reviewed. No pertinent surgical history.     reports that she has never smoked. She has never used smokeless tobacco. She reports current alcohol use of about 2.0 standard drinks of alcohol per week.  family history includes Cancer in her paternal grandmother; Parkinsonism in her paternal grandmother.     Review of Systems      Objective:          No data to display                 General:  Well defined, nourished, and groomed individual in no acute distress.    Psych:  Good mood and bright affect    NEUROLOGICAL EXAMINATION:     Mental Status:   Alert and oriented to person, place, and time with recent and remote memory intact.  Attention span and concentration are normal. Speech is fluent with a full fund of knowledge.      Cranial Nerves:  I: smell Not tested   II:

## 2024-11-22 ENCOUNTER — PATIENT MESSAGE (OUTPATIENT)
Age: 24
End: 2024-11-22

## 2024-11-22 DIAGNOSIS — G40.909 NONINTRACTABLE EPILEPSY WITHOUT STATUS EPILEPTICUS, UNSPECIFIED EPILEPSY TYPE (HCC): ICD-10-CM

## 2024-12-04 RX ORDER — LAMOTRIGINE 300 MG/1
300 TABLET, EXTENDED RELEASE ORAL
Qty: 90 TABLET | Refills: 3 | Status: SHIPPED | OUTPATIENT
Start: 2024-12-04

## 2025-02-04 ENCOUNTER — TELEMEDICINE (OUTPATIENT)
Age: 25
End: 2025-02-04
Payer: COMMERCIAL

## 2025-02-04 DIAGNOSIS — G40.909 NONINTRACTABLE EPILEPSY WITHOUT STATUS EPILEPTICUS, UNSPECIFIED EPILEPSY TYPE (HCC): ICD-10-CM

## 2025-02-04 PROCEDURE — 99213 OFFICE O/P EST LOW 20 MIN: CPT | Performed by: NURSE PRACTITIONER

## 2025-02-04 RX ORDER — LORAZEPAM 0.5 MG/1
TABLET ORAL
COMMUNITY
Start: 2024-12-02

## 2025-02-04 RX ORDER — LAMOTRIGINE 300 MG/1
300 TABLET, EXTENDED RELEASE ORAL
Qty: 90 TABLET | Refills: 3 | Status: SHIPPED | OUTPATIENT
Start: 2025-02-04

## 2025-02-04 NOTE — PROGRESS NOTES
[facility]  [place] [facility]  NEUROLOGY CLINIC   [address] [place] [address]   [phone] Office   [phone] Fax           Date:  25     Name:  MARY CASEY  :  2000  MRN:  [id number]     PCP:  None, None    Mary Casey is a 24 y.o. female who was seen by synchronous (real-time) audio-video technology on 2025 for Seizures    Subjective:   In November, she did have a breakthrough seizure coming out of sleep which she thinks was related to stress. The Lamictal XR was increased to 300mg nightly. She has not had any further episodes seizure. No side effects or signs of toxicity.    Current Outpatient Medications   Medication Sig    LORazepam (ATIVAN) 0.5 MG tablet     lamoTRIgine ER (LAMICTAL XR) 300 MG TB24 Take 300 mg by mouth nightly    FLUoxetine (PROZAC) 40 MG capsule Take 2 capsules by mouth daily    levonorgestrel (MIRENA) IUD 52 mg 1 Device by IntraUTERine route once     No current facility-administered medications for this visit.     No Known Allergies   Past Medical History:   Diagnosis Date    Anxiety disorder     Depression     Seizures (HCC)      History reviewed. No pertinent surgical history.     reports that she has never smoked. She has never used smokeless tobacco. She reports current alcohol use of about 2.0 standard drinks of alcohol per week.  family history includes Cancer in her paternal grandmother; Parkinsonism in her paternal grandmother.     Review of Systems      Objective:          No data to display                 General:  Well defined, nourished, and groomed individual in no acute distress.    Psych:  Good mood and bright affect    NEUROLOGICAL EXAMINATION:     Mental Status:   Alert and oriented to person, place, and time with recent and remote memory intact.  Attention span and concentration are normal. Speech is fluent with a full fund of knowledge.      Cranial Nerves:  I: smell Not